# Patient Record
Sex: FEMALE | Race: WHITE
[De-identification: names, ages, dates, MRNs, and addresses within clinical notes are randomized per-mention and may not be internally consistent; named-entity substitution may affect disease eponyms.]

---

## 2018-01-31 ENCOUNTER — HOSPITAL ENCOUNTER (OUTPATIENT)
Dept: HOSPITAL 89 - LAB | Age: 68
End: 2018-01-31
Attending: INTERNAL MEDICINE
Payer: COMMERCIAL

## 2018-01-31 DIAGNOSIS — E06.9: ICD-10-CM

## 2018-01-31 DIAGNOSIS — R07.9: ICD-10-CM

## 2018-01-31 DIAGNOSIS — I10: ICD-10-CM

## 2018-01-31 DIAGNOSIS — E78.4: ICD-10-CM

## 2018-01-31 DIAGNOSIS — E05.90: Primary | ICD-10-CM

## 2018-01-31 LAB — PLATELET COUNT, AUTOMATED: 231 K/UL (ref 150–450)

## 2018-01-31 PROCEDURE — 82310 ASSAY OF CALCIUM: CPT

## 2018-01-31 PROCEDURE — 84155 ASSAY OF PROTEIN SERUM: CPT

## 2018-01-31 PROCEDURE — 84450 TRANSFERASE (AST) (SGOT): CPT

## 2018-01-31 PROCEDURE — 84075 ASSAY ALKALINE PHOSPHATASE: CPT

## 2018-01-31 PROCEDURE — 84295 ASSAY OF SERUM SODIUM: CPT

## 2018-01-31 PROCEDURE — 83735 ASSAY OF MAGNESIUM: CPT

## 2018-01-31 PROCEDURE — 82565 ASSAY OF CREATININE: CPT

## 2018-01-31 PROCEDURE — 82247 BILIRUBIN TOTAL: CPT

## 2018-01-31 PROCEDURE — 82947 ASSAY GLUCOSE BLOOD QUANT: CPT

## 2018-01-31 PROCEDURE — 84460 ALANINE AMINO (ALT) (SGPT): CPT

## 2018-01-31 PROCEDURE — 36415 COLL VENOUS BLD VENIPUNCTURE: CPT

## 2018-01-31 PROCEDURE — 84132 ASSAY OF SERUM POTASSIUM: CPT

## 2018-01-31 PROCEDURE — 84443 ASSAY THYROID STIM HORMONE: CPT

## 2018-01-31 PROCEDURE — 82435 ASSAY OF BLOOD CHLORIDE: CPT

## 2018-01-31 PROCEDURE — 82040 ASSAY OF SERUM ALBUMIN: CPT

## 2018-01-31 PROCEDURE — 85025 COMPLETE CBC W/AUTO DIFF WBC: CPT

## 2018-01-31 PROCEDURE — 82374 ASSAY BLOOD CARBON DIOXIDE: CPT

## 2018-01-31 PROCEDURE — 84439 ASSAY OF FREE THYROXINE: CPT

## 2018-01-31 PROCEDURE — 84481 FREE ASSAY (FT-3): CPT

## 2018-01-31 PROCEDURE — 84520 ASSAY OF UREA NITROGEN: CPT

## 2018-02-01 NOTE — EKG
FACILITY: [facility] 

 

PATIENT NAME: DALIA ROBLEDO

: [id number]

MR: [id number]

V: [id number]

EXAM DATE: 

ORDERING PHYSICIAN: ALDAIR SÁNCHEZ

TECHNOLOGIST: MRAGON

 

Test Reason : CHEST PAIN

Blood Pressure : ***/*** mmHG

Vent. Rate : 062 BPM     Atrial Rate : 062 BPM

   P-R Int : 206 ms          QRS Dur : 078 ms

    QT Int : 424 ms       P-R-T Axes : 066 070 067 degrees

   QTc Int : 430 ms

 

Normal sinus rhythm

Low voltage QRS

Borderline ECG

When compared with ECG of 13-AUG-2017 00:34,

Nonspecific T wave abnormality no longer evident in Anterior leads

 

Referred By:             Confirmed By:

## 2018-02-21 ENCOUNTER — HOSPITAL ENCOUNTER (OUTPATIENT)
Dept: HOSPITAL 89 - RESP | Age: 68
End: 2018-02-21
Attending: INTERNAL MEDICINE
Payer: COMMERCIAL

## 2018-02-21 DIAGNOSIS — I10: ICD-10-CM

## 2018-02-21 DIAGNOSIS — R07.9: Primary | ICD-10-CM

## 2018-02-21 PROCEDURE — 78452 HT MUSCLE IMAGE SPECT MULT: CPT

## 2018-02-21 PROCEDURE — 93017 CV STRESS TEST TRACING ONLY: CPT

## 2018-02-21 NOTE — RADIOLOGY IMAGING REPORT
[place] [facility] [place] 

 

PATIENT NAME: Didi Romero

: 1950

MR: [id number]

V: [id number]

EXAM DATE: 

ORDERING PHYSICIAN: ALDAIR SÁNCHEZ

TECHNOLOGIST: 

 

Location: [facility] - [place]

Patient: Didi Romero

: 1950

MRN: [id number]

Visit/Account:[id number]

Date of Sevice:  2018

 

ACCESSION #: [id number]

 

EXAMINATION:  Single isotope SPECT imaging with regadenoson infusion and gated SPECT imaging.

 

DATE OF EXAMINATION:  18.

 

DATE OF INTERPRETATION:  18.

 

REQUESTING PHYSICIAN:  ALDAIR SÁNCHEZ.

 

INDICATION:  The patient is a 67-year-old female evaluated for [chest pain ].

 

PROCEDURE:  After informed consent the patient received an intravenous injection of 12.1 mCi of Tc-99
m sestamibi followed at an appropriate time interval by rest imaging.  The patient then subsequently 
received an intravenous infusion of 0.4 mg of regadenoson per protocol without complication.  Resting
 heart rate was 80 bpm with a peak heart rate of 100 bpm.  Blood pressure at rest was 135 / 86 and fo
llowing infusion was [149 ] / 86.  Baseline EKG demonstrates Normal sinus rhythm.  There were no EKG 
changes of ischemia following infusion.  Symptoms were nonspecific.  The patient then received an int
ravenous injection of 30.4 mCi of Tc-99m sestamibi followed by stress imaging.

 

RAW DATA:  Examination of the summed raw data revealed a excellent quality study. No significant sharron
facts.

 

MYOCARDIAL PERFUSION:  The tomographic images demonstrate normal myocardial perfusion uptake without 
ischemia or infarct. No TID.

 

GATED IMAGES:  The gated images demonstrate LVEF > 70%. No regional wall motion abnormalities.

 

IMPRESSION:

 

1.  Non-diagnostic pharmacologic stress EKG

2.  Normal myocardial perfusion scan.

3.  Normal LV systolic function; LVEF >70%.

4. Based on the results of this exam, the patient appears to be at low risk for future cardiovascular
 events.

 

Report Dictated By: Shen Adam at 2018 4:33 PM

 

Report E-Signed By: Shen Adam  at 2018 4:45 PM

 

WSN:[id number]

## 2018-03-19 ENCOUNTER — HOSPITAL ENCOUNTER (OUTPATIENT)
Dept: HOSPITAL 89 - RAD | Age: 68
End: 2018-03-19
Attending: INTERNAL MEDICINE
Payer: COMMERCIAL

## 2018-03-19 DIAGNOSIS — R06.02: ICD-10-CM

## 2018-03-19 DIAGNOSIS — R05: ICD-10-CM

## 2018-03-19 DIAGNOSIS — R07.9: Primary | ICD-10-CM

## 2018-03-19 PROCEDURE — 71046 X-RAY EXAM CHEST 2 VIEWS: CPT

## 2018-03-19 NOTE — RADIOLOGY IMAGING REPORT
FACILITY: [facility] 

 

PATIENT NAME: Didi Romero

: 1950

MR: [id number]

V: [id number]

EXAM DATE: 

ORDERING PHYSICIAN: ALDAIR SÁNCHEZ

TECHNOLOGIST: 

 

Location: [facility] - [place]

Patient: Didi Romero

: 1950

MRN: [id number]

Visit/Account:[id number]

Date of Sevice:  3/19/2018

 

ACCESSION #: [id number]

 

2 VIEWS CHEST

 

INDICATION: Chest pain contrast breath and chronic cough.

 

COMPARISON: 2014.

 

FINDINGS:

Cardiomediastinal silhouette and pulmonary vessels within normal limits.

There is no focal infiltrate or lobar consolidation.

There is no pneumothorax or pleural effusion.

No nodule.

Upper abdomen is unremarkable. No acute bony abnormality. Spinal stabilization with by pedicular scre
ws and posterior rods in place. No sequelae.

 

IMPRESSION:

1. No acute cardiopulmonary process.

 

Report Dictated By: Eze Troy at 3/19/2018 5:35 PM

 

Report E-Signed By: Eze Troy  at 3/19/2018 5:37 PM

 

WSN:[id number]

## 2018-04-24 ENCOUNTER — HOSPITAL ENCOUNTER (OUTPATIENT)
Dept: HOSPITAL 89 - RESP | Age: 68
End: 2018-04-24
Attending: INTERNAL MEDICINE
Payer: COMMERCIAL

## 2018-04-24 DIAGNOSIS — R07.9: ICD-10-CM

## 2018-04-24 DIAGNOSIS — J98.4: ICD-10-CM

## 2018-04-24 DIAGNOSIS — R05: Primary | ICD-10-CM

## 2018-04-24 DIAGNOSIS — R06.02: ICD-10-CM

## 2018-04-24 PROCEDURE — 94060 EVALUATION OF WHEEZING: CPT

## 2018-04-24 PROCEDURE — 94726 PLETHYSMOGRAPHY LUNG VOLUMES: CPT

## 2018-04-24 PROCEDURE — 94729 DIFFUSING CAPACITY: CPT

## 2018-05-04 ENCOUNTER — HOSPITAL ENCOUNTER (OUTPATIENT)
Dept: HOSPITAL 89 - LAB | Age: 68
End: 2018-05-04
Attending: INTERNAL MEDICINE
Payer: COMMERCIAL

## 2018-05-04 DIAGNOSIS — M62.838: ICD-10-CM

## 2018-05-04 DIAGNOSIS — E78.5: ICD-10-CM

## 2018-05-04 DIAGNOSIS — I10: ICD-10-CM

## 2018-05-04 DIAGNOSIS — E05.90: ICD-10-CM

## 2018-05-04 DIAGNOSIS — R06.02: Primary | ICD-10-CM

## 2018-05-04 LAB
LDLC SERPL-MCNC: 76 MG/DL
PLATELET COUNT, AUTOMATED: 240 K/UL (ref 150–450)

## 2018-05-04 PROCEDURE — 82247 BILIRUBIN TOTAL: CPT

## 2018-05-04 PROCEDURE — 84132 ASSAY OF SERUM POTASSIUM: CPT

## 2018-05-04 PROCEDURE — 84520 ASSAY OF UREA NITROGEN: CPT

## 2018-05-04 PROCEDURE — 36415 COLL VENOUS BLD VENIPUNCTURE: CPT

## 2018-05-04 PROCEDURE — 82435 ASSAY OF BLOOD CHLORIDE: CPT

## 2018-05-04 PROCEDURE — 83735 ASSAY OF MAGNESIUM: CPT

## 2018-05-04 PROCEDURE — 84443 ASSAY THYROID STIM HORMONE: CPT

## 2018-05-04 PROCEDURE — 82040 ASSAY OF SERUM ALBUMIN: CPT

## 2018-05-04 PROCEDURE — 84155 ASSAY OF PROTEIN SERUM: CPT

## 2018-05-04 PROCEDURE — 83718 ASSAY OF LIPOPROTEIN: CPT

## 2018-05-04 PROCEDURE — 82306 VITAMIN D 25 HYDROXY: CPT

## 2018-05-04 PROCEDURE — 85025 COMPLETE CBC W/AUTO DIFF WBC: CPT

## 2018-05-04 PROCEDURE — 84478 ASSAY OF TRIGLYCERIDES: CPT

## 2018-05-04 PROCEDURE — 82565 ASSAY OF CREATININE: CPT

## 2018-05-04 PROCEDURE — 84439 ASSAY OF FREE THYROXINE: CPT

## 2018-05-04 PROCEDURE — 84295 ASSAY OF SERUM SODIUM: CPT

## 2018-05-04 PROCEDURE — 84460 ALANINE AMINO (ALT) (SGPT): CPT

## 2018-05-04 PROCEDURE — 84550 ASSAY OF BLOOD/URIC ACID: CPT

## 2018-05-04 PROCEDURE — 82374 ASSAY BLOOD CARBON DIOXIDE: CPT

## 2018-05-04 PROCEDURE — 82947 ASSAY GLUCOSE BLOOD QUANT: CPT

## 2018-05-04 PROCEDURE — 82465 ASSAY BLD/SERUM CHOLESTEROL: CPT

## 2018-05-04 PROCEDURE — 84450 TRANSFERASE (AST) (SGOT): CPT

## 2018-05-04 PROCEDURE — 84075 ASSAY ALKALINE PHOSPHATASE: CPT

## 2018-05-04 PROCEDURE — 82310 ASSAY OF CALCIUM: CPT

## 2018-08-15 ENCOUNTER — HOSPITAL ENCOUNTER (OUTPATIENT)
Dept: HOSPITAL 89 - LAB | Age: 68
End: 2018-08-15
Attending: INTERNAL MEDICINE
Payer: COMMERCIAL

## 2018-08-15 DIAGNOSIS — T50.905A: ICD-10-CM

## 2018-08-15 DIAGNOSIS — E83.52: ICD-10-CM

## 2018-08-15 DIAGNOSIS — E03.9: Primary | ICD-10-CM

## 2018-08-15 PROCEDURE — 82565 ASSAY OF CREATININE: CPT

## 2018-08-15 PROCEDURE — 82435 ASSAY OF BLOOD CHLORIDE: CPT

## 2018-08-15 PROCEDURE — 84443 ASSAY THYROID STIM HORMONE: CPT

## 2018-08-15 PROCEDURE — 84295 ASSAY OF SERUM SODIUM: CPT

## 2018-08-15 PROCEDURE — 82374 ASSAY BLOOD CARBON DIOXIDE: CPT

## 2018-08-15 PROCEDURE — 82310 ASSAY OF CALCIUM: CPT

## 2018-08-15 PROCEDURE — 36415 COLL VENOUS BLD VENIPUNCTURE: CPT

## 2018-08-15 PROCEDURE — 84132 ASSAY OF SERUM POTASSIUM: CPT

## 2018-08-15 PROCEDURE — 82040 ASSAY OF SERUM ALBUMIN: CPT

## 2018-08-15 PROCEDURE — 84520 ASSAY OF UREA NITROGEN: CPT

## 2018-08-15 PROCEDURE — 82947 ASSAY GLUCOSE BLOOD QUANT: CPT

## 2018-08-15 PROCEDURE — 84481 FREE ASSAY (FT-3): CPT

## 2018-08-15 PROCEDURE — 84439 ASSAY OF FREE THYROXINE: CPT

## 2019-01-17 ENCOUNTER — HOSPITAL ENCOUNTER (OUTPATIENT)
Dept: HOSPITAL 89 - LAB | Age: 69
End: 2019-01-17
Attending: INTERNAL MEDICINE
Payer: COMMERCIAL

## 2019-01-17 DIAGNOSIS — M81.0: Primary | ICD-10-CM

## 2019-01-17 DIAGNOSIS — E05.90: ICD-10-CM

## 2019-01-17 DIAGNOSIS — E78.5: ICD-10-CM

## 2019-01-17 DIAGNOSIS — I10: ICD-10-CM

## 2019-01-17 LAB
LDLC SERPL-MCNC: 116 MG/DL
PLATELET COUNT, AUTOMATED: 254 K/UL (ref 150–450)

## 2019-01-17 PROCEDURE — 84075 ASSAY ALKALINE PHOSPHATASE: CPT

## 2019-01-17 PROCEDURE — 36415 COLL VENOUS BLD VENIPUNCTURE: CPT

## 2019-01-17 PROCEDURE — 82306 VITAMIN D 25 HYDROXY: CPT

## 2019-01-17 PROCEDURE — 83735 ASSAY OF MAGNESIUM: CPT

## 2019-01-17 PROCEDURE — 84460 ALANINE AMINO (ALT) (SGPT): CPT

## 2019-01-17 PROCEDURE — 84295 ASSAY OF SERUM SODIUM: CPT

## 2019-01-17 PROCEDURE — 84443 ASSAY THYROID STIM HORMONE: CPT

## 2019-01-17 PROCEDURE — 84155 ASSAY OF PROTEIN SERUM: CPT

## 2019-01-17 PROCEDURE — 83036 HEMOGLOBIN GLYCOSYLATED A1C: CPT

## 2019-01-17 PROCEDURE — 84450 TRANSFERASE (AST) (SGOT): CPT

## 2019-01-17 PROCEDURE — 82565 ASSAY OF CREATININE: CPT

## 2019-01-17 PROCEDURE — 81001 URINALYSIS AUTO W/SCOPE: CPT

## 2019-01-17 PROCEDURE — 85025 COMPLETE CBC W/AUTO DIFF WBC: CPT

## 2019-01-17 PROCEDURE — 83970 ASSAY OF PARATHORMONE: CPT

## 2019-01-17 PROCEDURE — 82465 ASSAY BLD/SERUM CHOLESTEROL: CPT

## 2019-01-17 PROCEDURE — 84478 ASSAY OF TRIGLYCERIDES: CPT

## 2019-01-17 PROCEDURE — 84520 ASSAY OF UREA NITROGEN: CPT

## 2019-01-17 PROCEDURE — 82247 BILIRUBIN TOTAL: CPT

## 2019-01-17 PROCEDURE — 82374 ASSAY BLOOD CARBON DIOXIDE: CPT

## 2019-01-17 PROCEDURE — 83718 ASSAY OF LIPOPROTEIN: CPT

## 2019-01-17 PROCEDURE — 82040 ASSAY OF SERUM ALBUMIN: CPT

## 2019-01-17 PROCEDURE — 84439 ASSAY OF FREE THYROXINE: CPT

## 2019-01-17 PROCEDURE — 82310 ASSAY OF CALCIUM: CPT

## 2019-01-17 PROCEDURE — 82947 ASSAY GLUCOSE BLOOD QUANT: CPT

## 2019-01-17 PROCEDURE — 82435 ASSAY OF BLOOD CHLORIDE: CPT

## 2019-01-17 PROCEDURE — 84132 ASSAY OF SERUM POTASSIUM: CPT

## 2019-01-17 PROCEDURE — 84550 ASSAY OF BLOOD/URIC ACID: CPT

## 2019-04-02 ENCOUNTER — HOSPITAL ENCOUNTER (OUTPATIENT)
Dept: HOSPITAL 89 - LAB | Age: 69
End: 2019-04-02
Attending: ANESTHESIOLOGY
Payer: COMMERCIAL

## 2019-04-02 DIAGNOSIS — Z01.810: ICD-10-CM

## 2019-04-02 DIAGNOSIS — Z01.812: Primary | ICD-10-CM

## 2019-04-02 DIAGNOSIS — M72.2: ICD-10-CM

## 2019-04-02 DIAGNOSIS — R94.31: ICD-10-CM

## 2019-04-02 LAB — PLATELET COUNT, AUTOMATED: 239 K/UL (ref 150–450)

## 2019-04-02 PROCEDURE — 82247 BILIRUBIN TOTAL: CPT

## 2019-04-02 PROCEDURE — 84075 ASSAY ALKALINE PHOSPHATASE: CPT

## 2019-04-02 PROCEDURE — 82310 ASSAY OF CALCIUM: CPT

## 2019-04-02 PROCEDURE — 84155 ASSAY OF PROTEIN SERUM: CPT

## 2019-04-02 PROCEDURE — 82947 ASSAY GLUCOSE BLOOD QUANT: CPT

## 2019-04-02 PROCEDURE — 84450 TRANSFERASE (AST) (SGOT): CPT

## 2019-04-02 PROCEDURE — 84520 ASSAY OF UREA NITROGEN: CPT

## 2019-04-02 PROCEDURE — 84295 ASSAY OF SERUM SODIUM: CPT

## 2019-04-02 PROCEDURE — 83036 HEMOGLOBIN GLYCOSYLATED A1C: CPT

## 2019-04-02 PROCEDURE — 82435 ASSAY OF BLOOD CHLORIDE: CPT

## 2019-04-02 PROCEDURE — 84443 ASSAY THYROID STIM HORMONE: CPT

## 2019-04-02 PROCEDURE — 84132 ASSAY OF SERUM POTASSIUM: CPT

## 2019-04-02 PROCEDURE — 85025 COMPLETE CBC W/AUTO DIFF WBC: CPT

## 2019-04-02 PROCEDURE — 93005 ELECTROCARDIOGRAM TRACING: CPT

## 2019-04-02 PROCEDURE — 36415 COLL VENOUS BLD VENIPUNCTURE: CPT

## 2019-04-02 PROCEDURE — 82565 ASSAY OF CREATININE: CPT

## 2019-04-02 PROCEDURE — 82374 ASSAY BLOOD CARBON DIOXIDE: CPT

## 2019-04-02 PROCEDURE — 84460 ALANINE AMINO (ALT) (SGPT): CPT

## 2019-04-02 PROCEDURE — 82040 ASSAY OF SERUM ALBUMIN: CPT

## 2019-04-02 NOTE — EKG
FACILITY: [facility] - [place] 

 

PATIENT NAME: DALIA ROBLEDO

: [id number]

MR: [id number]

V: [id number]

EXAM DATE: 

ORDERING PHYSICIAN: JAE BLAIR

TECHNOLOGIST: RIVERA

 

Test Reason : PRE-OP FOOT

Blood Pressure : ***/*** mmHG

Vent. Rate : 065 BPM     Atrial Rate : 065 BPM

   P-R Int : 194 ms          QRS Dur : 078 ms

    QT Int : 398 ms       P-R-T Axes : 042 060 057 degrees

   QTc Int : 413 ms

 

Normal sinus rhythm

Low voltage QRS

Borderline ECG

When compared with ECG of 2018 16:06,

No significant change was found

Confirmed by JAE BENITEZ ([id number]) on 4/3/2019 6:36:31 AM

 

Referred By:  JOHNNIE           Confirmed By:JAE BENITEZ

## 2019-06-05 ENCOUNTER — HOSPITAL ENCOUNTER (OUTPATIENT)
Dept: HOSPITAL 89 - RAD | Age: 69
End: 2019-06-05
Attending: INTERNAL MEDICINE
Payer: COMMERCIAL

## 2019-06-05 DIAGNOSIS — M85.832: Primary | ICD-10-CM

## 2019-06-05 PROCEDURE — 77080 DXA BONE DENSITY AXIAL: CPT

## 2019-06-05 NOTE — RADIOLOGY IMAGING REPORT
FACILITY: [facility] - [place] 

 

PATIENT NAME: Didi Romero

: 1950

MR: [id number]

V: [id number]

EXAM DATE: 

ORDERING PHYSICIAN: ALDAIR SÁNCHEZ

TECHNOLOGIST: 

 

Location: [facility] - [place]

Patient: Didi Romero

: 1950

MRN: [id number]

Visit/Account:[id number]

Date of Sevice:  2019

 

ACCESSION #: [id number]

 

DEXA Scan 2019 7:29 AM

 

Clinical history: osteoporosis

 

Comparison:  DEXA scan from 3/2/2017.

 

 

FOREARM:

The bone mineral density (BMD) measured in the ULTRADISTAL Left forearm, where trabecular bone predom
inates, correlates with a Z-score of 0.1 and a T-score of -1.6 which is osteopenia as defined by the 
World Health Organization.  The corresponding risk of fracture in the distal forearm is 3-4 compared 
with a young adult reference population.

 

The bone mineral density (BMD) in the MIDSHAFT of the forearm, where cortical bone predominates, steff
elates with a Z-score of 0.3 and a T-score of -1.4 which is osteopenia as defined by the World Health
 Organization. The corresponding risk of fracture in the midshaft of the forearm is 2-3 times compare
d with a young adult reference population.  This value has increased by 3.3 % since the prior study. 
 More than 5% change is considered significant.

 

IMPRESSION:

Left Forearm:  Osteopenia.  There has been no significant change in the bone mineral density since  previous exam

 

 

FRAX? WHO Fracture Risk Assessment Tool link:

<http://www.shef.ac.uk/FRAX/tool.jsp?locationValue=9>

 

PLEASE NOTE:

 

1)  The World Health Organization defines low BMD as follows:

                                                                             T-score

                   Normal                                  > -1

                   Osteopenia                           < -1 and  > -2.5

                   Osteoporosis                         < -2.5 without fractures

                   Established osteoporosis       < -2.5 with fractures

2)  In general, you may wish to consider:

                    Diagnosis                  Treatment                       Follow-up DEXA

 

                    Normal BMD              Prevention                      2-3 years

                    Osteopenia                Prevention/therapy         1-2 years

                    Osteoporosis             Therapy                           Yearly

3)  Fracture risk estimated from the T-score is more accurate for vertebral fractures (often spontane
ous) than for hip fractures.

 

Report Dictated By: Turner Belle MD at 2019 10:52 AM

 

Report E-Signed By: Turner Belle MD  at 2019 10:55 AM

 

WSN:[id number]

## 2019-06-18 ENCOUNTER — HOSPITAL ENCOUNTER (OUTPATIENT)
Dept: HOSPITAL 89 - LAB | Age: 69
End: 2019-06-18
Attending: INTERNAL MEDICINE
Payer: COMMERCIAL

## 2019-06-18 DIAGNOSIS — M81.0: Primary | ICD-10-CM

## 2019-06-18 DIAGNOSIS — E83.42: ICD-10-CM

## 2019-06-18 DIAGNOSIS — E78.5: ICD-10-CM

## 2019-06-18 DIAGNOSIS — R73.03: ICD-10-CM

## 2019-06-18 DIAGNOSIS — I10: ICD-10-CM

## 2019-06-18 LAB — LDLC SERPL-MCNC: 64 MG/DL

## 2019-06-18 PROCEDURE — 83718 ASSAY OF LIPOPROTEIN: CPT

## 2019-06-18 PROCEDURE — 84132 ASSAY OF SERUM POTASSIUM: CPT

## 2019-06-18 PROCEDURE — 84478 ASSAY OF TRIGLYCERIDES: CPT

## 2019-06-18 PROCEDURE — 36415 COLL VENOUS BLD VENIPUNCTURE: CPT

## 2019-06-18 PROCEDURE — 83735 ASSAY OF MAGNESIUM: CPT

## 2019-06-18 PROCEDURE — 82040 ASSAY OF SERUM ALBUMIN: CPT

## 2019-06-18 PROCEDURE — 82435 ASSAY OF BLOOD CHLORIDE: CPT

## 2019-06-18 PROCEDURE — 82565 ASSAY OF CREATININE: CPT

## 2019-06-18 PROCEDURE — 82247 BILIRUBIN TOTAL: CPT

## 2019-06-18 PROCEDURE — 82374 ASSAY BLOOD CARBON DIOXIDE: CPT

## 2019-06-18 PROCEDURE — 84155 ASSAY OF PROTEIN SERUM: CPT

## 2019-06-18 PROCEDURE — 84520 ASSAY OF UREA NITROGEN: CPT

## 2019-06-18 PROCEDURE — 84295 ASSAY OF SERUM SODIUM: CPT

## 2019-06-18 PROCEDURE — 84550 ASSAY OF BLOOD/URIC ACID: CPT

## 2019-06-18 PROCEDURE — 84450 TRANSFERASE (AST) (SGOT): CPT

## 2019-06-18 PROCEDURE — 82947 ASSAY GLUCOSE BLOOD QUANT: CPT

## 2019-06-18 PROCEDURE — 84075 ASSAY ALKALINE PHOSPHATASE: CPT

## 2019-06-18 PROCEDURE — 82465 ASSAY BLD/SERUM CHOLESTEROL: CPT

## 2019-06-18 PROCEDURE — 83036 HEMOGLOBIN GLYCOSYLATED A1C: CPT

## 2019-06-18 PROCEDURE — 84460 ALANINE AMINO (ALT) (SGPT): CPT

## 2019-06-18 PROCEDURE — 82310 ASSAY OF CALCIUM: CPT
